# Patient Record
(demographics unavailable — no encounter records)

---

## 2018-12-12 NOTE — PHYS DOC
Past Medical History


Past Medical History:  No Pertinent History


Past Surgical History:  No Surgical History


Alcohol Use:  None


Drug Use:  None





Adult General


Chief Complaint


Chief Complaint:  NAUSEA/VOMITING/DIARRHA





HPI


HPI





Patient is a 22  year old female who presents to the emergency room today with 

complaints of an irregular vaginal discharge for the last 2 days and 2 episodes 

of nausea and vomiting in last 24 hours. She states her last menstrual period 

was on November 9, 2018. She denies any concerns of pregnancy. Patient states 

that she first vomited after having intercourse last night and then today at 

work when she was working over the [employer] she became nauseated and vomited a 

second time. She denies any fever, diarrhea, dysuria, increased urinary 

frequency, hematuria, or abnormal vaginal odor. Patient also denies any back 

pain, abdominal pain, or pelvic pain.





Review of Systems


Review of Systems





Constitutional: Denies fever or chills []


Eyes: Denies redness, or eye pain []


HENT: Denies nasal congestion or sore throat []


Respiratory: Denies cough or shortness of breath []


GI: Denies abdominal pain, or diarrhea; see history of present illness []


: Denies dysuria or hematuria; see history of present illness []


Musculoskeletal: Denies back pain


Integument: Denies rash or skin lesions []


Neurologic: Denies headache, focal weakness or sensory changes []








All other systems were reviewed and found to be within normal limits, except as 

documented in this note.





Current Medications


Current Medications





Current Medications








 Medications


  (Trade)  Dose


 Ordered  Sig/Sky  Start Time


 Stop Time Status Last Admin


Dose Admin


 


 Azithromycin


  (Zithromax)  1,000 mg  1X  ONCE  12/12/18 16:30


 12/12/18 16:31 DC 12/12/18 16:58


1,000 MG


 


 Ceftriaxone Sodium


  (Rocephin Im)  250 mg  1X  ONCE  12/12/18 16:30


 12/12/18 16:31 DC 12/12/18 16:57


250 MG


 


 Ceftriaxone Sodium


  (Rocephin)  1 gm  1X  ONCE  12/12/18 17:30


 12/12/18 17:31 DC 12/12/18 18:12


1 GM


 


 Ondansetron HCl


  (Zofran)  4 mg  1X  ONCE  12/12/18 16:30


 12/12/18 16:31 DC 12/12/18 16:58


4 MG


 


 Sodium Chloride  1,000 ml @ 


 1,000 mls/hr  1X  ONCE  12/12/18 16:30


 12/12/18 17:29 DC 12/12/18 16:57


1,000 MLS/HR











Allergies


Allergies





Allergies








Coded Allergies Type Severity Reaction Last Updated Verified


 


  No Known Drug Allergies    8/22/14 No











Physical Exam


Physical Exam





Constitutional: Well developed, well nourished, no acute distress, non-toxic 

appearance. []


HENT: Normocephalic, atraumatic, bilateral external ears normal,  nose normal. [

]


Eyes: conjunctiva normal, no discharge. [] 


Neck: Normal range of motion


 Pelvic Exam: Chaperone present [place] ERT


 Abdomen:      Nontender


 External Genitalia:   Normal Skin


 Speculum:      Normal vaginal mucosa, normal cervical discharge


 Bimanual:       No adnexal masses or tenderness, No CMT


Skin: Warm, dry, no erythema, no rash. [] 


Extremities: No cyanosis, ROM intact, no edema. [] 


Neurologic: Alert and oriented X 3, normal motor function, normal sensory 

function, no focal deficits noted. []


Psychologic: Affect normal, judgement normal, mood normal. []





Current Patient Data


Vital Signs





 Vital Signs








  Date Time  Temp Pulse Resp B/P (MAP) Pulse Ox O2 Delivery O2 Flow Rate FiO2


 


12/12/18 17:30  84 18 115/74 (88) 98   


 


12/12/18 15:43 97.8     Room Air  





 97.8       








Lab Values





 Laboratory Tests








Test


 12/12/18


15:39 12/12/18


15:48 12/12/18


16:47


 


Urine Collection Type Void    


 


Urine Color Yellow    


 


Urine Clarity Clear    


 


Urine pH 6.5    


 


Urine Specific Gravity 1.025    


 


Urine Protein


 Negative mg/dL


(NEG-TRACE) 


 





 


Urine Glucose (UA)


 Negative mg/dL


(NEG) 


 





 


Urine Ketones (Stick)


 Negative mg/dL


(NEG) 


 





 


Urine Blood Trace (NEG)    


 


Urine Nitrite


 Positive (NEG)


 


 





 


Urine Bilirubin


 Negative (NEG)


 


 





 


Urine Urobilinogen Dipstick


 1.0 mg/dL (0.2


mg/dL) 


 





 


Urine Leukocyte Esterase Large (NEG)    


 


Urine RBC


 1-2 /HPF (0-2)


 


 





 


Urine WBC


 11-20 /HPF


(0-4) 


 





 


Urine Squamous Epithelial


Cells Many /LPF  


 


 





 


Urine Bacteria


 Many /HPF


(0-FEW) 


 





 


Urine Mucus Marked /LPF    


 


POC Urine HCG, Qualitative


 


 Hcg negative


(Negative) 





 


White Blood Count


 


 


 6.7 x10^3/uL


(4.0-11.0)


 


Red Blood Count


 


 


 4.63 x10^6/uL


(3.50-5.40)


 


Hemoglobin


 


 


 14.7 g/dL


(12.0-15.5)


 


Hematocrit


 


 


 41.5 %


(36.0-47.0)


 


Mean Corpuscular Volume


 


 


 90 fL ()





 


Mean Corpuscular Hemoglobin   32 pg (25-35)  


 


Mean Corpuscular Hemoglobin


Concent 


 


 35 g/dL


(31-37)


 


Red Cell Distribution Width


 


 


 12.4 %


(11.5-14.5)


 


Platelet Count


 


 


 246 x10^3/uL


(140-400)


 


Neutrophils (%) (Auto)   63 % (31-73)  


 


Lymphocytes (%) (Auto)   29 % (24-48)  


 


Monocytes (%) (Auto)   6 % (0-9)  


 


Eosinophils (%) (Auto)   1 % (0-3)  


 


Basophils (%) (Auto)   1 % (0-3)  


 


Neutrophils # (Auto)


 


 


 4.2 x10^3uL


(1.8-7.7)


 


Lymphocytes # (Auto)


 


 


 1.9 x10^3/uL


(1.0-4.8)


 


Monocytes # (Auto)


 


 


 0.4 x10^3/uL


(0.0-1.1)


 


Eosinophils # (Auto)


 


 


 0.1 x10^3/uL


(0.0-0.7)


 


Basophils # (Auto)


 


 


 0.0 x10^3/uL


(0.0-0.2)


 


Sodium Level


 


 


 137 mmol/L


(136-145)


 


Potassium Level


 


 


 3.9 mmol/L


(3.5-5.1)


 


Chloride Level


 


 


 103 mmol/L


()


 


Carbon Dioxide Level


 


 


 29 mmol/L


(21-32)


 


Anion Gap   5 (6-14)  L


 


Blood Urea Nitrogen


 


 


 16 mg/dL


(7-20)


 


Creatinine


 


 


 0.9 mg/dL


(0.6-1.0)


 


Estimated GFR


(Cockcroft-Gault) 


 


 78.3  





 


Glucose Level


 


 


 94 mg/dL


(70-99)


 


Calcium Level


 


 


 9.2 mg/dL


(8.5-10.1)





 Laboratory Tests


12/12/18 16:47








 Laboratory Tests


12/12/18 16:47











Microbiology


12/12/18 Wet Prep - Final, Complete


           








EKG


EKG


[]





Radiology/Procedures


Radiology/Procedures


[]





Course & Med Decision Making


Course & Med Decision Making


Pertinent Labs and Imaging studies reviewed. (See chart for details)


dx: nausea vomiting, UTI


Wet mount was negative for any abnormal findings. CBC and BMP within normal 

limits. UA positive for nitrites, large amount of leukocytes, and many bacteria


Pt received 1 gm rocephin IV, 1 L NS, and 4 mg of zofran in the ER. Reports 

feeling better


Patient was treated prophylactically with 250 mg of IM Rocephin, and 1 g of PO 

Zithromax. Patient was instructed to avoid having intercourse until the results 

of gonorrhea and chlamydia testing were available, patient was notified that 

these results would not be available for 48 hours. If one or both of these 

tests is positive, patient needs to refrain from intercourse for approximately 

2 weeks following the treatment of any current partners. 


Prescription written for keflex and zofran. Increase clear fluids, avoid 

bladder irritants. Follow up with PCP if sx persist, return to ER if symptoms 

worsen. 


Patient verbalized an understanding of home care, medications, follow-up, and 

return to ED instructions and was in agreement with the plan of care.








[]


Staff Physician Addendum:


I was working in the ER during the course of this patient's visit.  I was 

available for consultation as needed, but I was not directly involved in the 

care of this patient.





Dragon Disclaimer


Dragon Disclaimer


This electronic medical record was generated, in whole or in part, using a 

voice recognition dictation system.





Departure


Departure


Impression:  


 Primary Impression:  


 Nausea & vomiting


 Additional Impression:  


 UTI (urinary tract infection)


Disposition:  01 HOME, SELF-CARE


Condition:  STABLE


Referrals:  


NO PCP (PCP)


Patient Instructions:  Nausea and Vomiting, Easy-to-Read, Urinary Tract 

Infection, Easy-to-Read





Additional Instructions:  


Fill prescription(s) and use as directed. Avoid bladder irritants such as 

caffeine, carbonation, and spicy foods. Increase clear fluids. You were treated 

prophylactically for a suspected STI, to avoid having intercourse until the 

results of gonorrhea and chlamydia testing are available. These results will 

not be available for 48 hours. If one or both of these tests is positive, You 

need to refrain from intercourse for approximately 2 weeks following the 

treatment of any current partners. Follow up with PCP if sx persist, return to 

ER if symptoms worsen. Follow up with your primary care doctor if symptoms 

persist, return to the ER if symptoms worsen.


Scripts


Ondansetron (ONDANSETRON ODT) 4 Mg Tab.rapdis


1 TAB PO PRN Q6-8HRS PRN for NAUSEA/VOMITING for 4 Days, #16 TAB 0 Refills


   Prov: JENNY HA         12/12/18 


Cephalexin (KEFLEX) 500 Mg Capsule


1 CAP PO BID, #14 CAP 0 Refills


   Prov: JENNY HA         12/12/18





Problem Qualifiers








 Primary Impression:  


 Nausea & vomiting


 Vomiting type:  unspecified  Vomiting Intractability:  non-intractable  

Qualified Codes:  R11.2 - Nausea with vomiting, unspecified


 Additional Impression:  


 UTI (urinary tract infection)


 Urinary tract infection type:  site unspecified  Hematuria presence:  without 

hematuria  Qualified Codes:  N39.0 - Urinary tract infection, site not specified








JENNY HA Dec 12, 2018 16:58


KATERYNA ADKINS MD Dec 13, 2018 06:10

## 2019-02-12 NOTE — PHYS DOC
Past Medical History


Past Medical History:  No Pertinent History


Past Surgical History:  No Surgical History


Alcohol Use:  None


Drug Use:  None





Adult General


Chief Complaint


Chief Complaint:  SORE THROAT





HPI


HPI





Patient is a 22  year old female who is presents for evaluation of sore throat. 

Patient reports onset yesterday. Positive cough, positive nasal congestion, 

positive postnasal drip. Pain is mild. 2-3 out of 10, sharp. No difficulty with 

by mouth intake. No fever. No sick contacts. No myalgias. No history of 

recurrent strep. Unsure pregnancy status. Has not taken anything over-the-

counter. Requesting work note.





Review of Systems


Review of Systems





Constitutional: Denies fever or chills []


Eyes: Denies change in visual acuity, redness, or eye pain []


HENT: +nasal congestion present, +sore throat


Respiratory: Denies cough or shortness of breath []


Cardiovascular: No chest pain, no palpitations, no orthopnea


GI: Denies abdominal pain, nausea, vomiting, bloody stools or diarrhea []


: Denies dysuria or hematuria []





Integument: Denies rash or skin lesions []





Endocrine: Denies polyuria or polydipsia []





All other systems were reviewed and found to be within normal limits, except as 

documented in this note.





Allergies


Allergies





Allergies








Coded Allergies Type Severity Reaction Last Updated Verified


 


  No Known Drug Allergies    8/22/14 No











Physical Exam


Physical Exam





Constitutional: Well developed, well nourished, no acute distress, non-toxic 

appearance. []


HENT: Normocephalic, atraumatic, Mild oropharynx erythema, moist, no oral 

exudates, nose normal. []


Eyes: PERRLA, EOMI, conjunctiva normal, no discharge. [] 


Neck: no stridor. [] 


Cardiovascular:Heart rate regular rhythm, no murmur []


Lungs & Thorax:  Bilateral breath sounds clear to auscultation []


Back: No tenderness, no CVA tenderness. [] 


Extremities:  no edema. [] 


Neurologic: Alert and oriented X 3, no focal deficits noted. []


Psychologic: Affect normal, judgement normal, mood normal. []





Current Patient Data


Vital Signs





 Vital Signs








  Date Time  Temp Pulse Resp B/P (MAP) Pulse Ox O2 Delivery O2 Flow Rate FiO2


 


2/12/19 11:23 98.3 90 16 108/69 (82) 97 Room Air  





 98.3       








Lab Values





 Laboratory Tests








Test


 2/12/19


11:58


 


POC Urine HCG, Qualitative


 Hcg negative


(Negative)











EKG


EKG


[]





Radiology/Procedures


Radiology/Procedures


[]





Course & Med Decision Making


Course & Med Decision Making


Pertinent Labs and Imaging studies reviewed. (See chart for details)





[]Pregnancy test negative. Advised continue supportive care. History and exam 

consistent with URI at this time. ER return precautions given. Patient 

verbalized understanding. All questions answered.





Dragon Disclaimer


Dragon Disclaimer


This electronic medical record was generated, in whole or in part, using a 

voice recognition dictation system.





Departure


Departure


Impression:  


 Primary Impression:  


 Cough


 Additional Impression:  


 Viral pharyngitis


Disposition:  01 HOME, SELF-CARE


Condition:  STABLE


Referrals:  


NO PCP (PCP)


Patient Instructions:  Cough, Adult, Sore Throat, Easy-to-Read





Additional Instructions:  


Thank you for coming to [facility]. Please read the attached 

handouts. Please follow-up with your primary care physician. Take over-the-

counter ibuprofen or Tylenol for pain control. Return to the ER  if your 

symptoms worsen or you have any other concerns.





Problem Qualifiers











SUMMER VAZQUEZ DO Feb 12, 2019 12:26

## 2019-08-04 NOTE — RAD
Exam performed: X-ray abdomen KUB. 

 

Clinical Indication: Abdominal pain

 

Date of Service: 8/4/2019  Comparison: None available

 

FINDINGS:

 

Supine radiograph of the abdomen and pelvis reveals no evidence of ileus 

or obstruction. Definite pathologic calcification or organomegaly is not 

identified.  The visualized osseous structures appear unremarkable.

 

Impression:

 

    1. Negative exam 

 

Electronically signed by: Rupa Landry MD (8/4/2019 5:47 PM) [facility]

## 2019-08-04 NOTE — PHYS DOC
Past Medical History


Past Medical History:  No Pertinent History


Past Surgical History:  No Surgical History


Alcohol Use:  None


Drug Use:  None





Adult General


Chief Complaint


Chief Complaint:  NAUSEA/VOMITING/DIARRHA





HPI


HPI





Patient is a 23  year old [female] who presents with [left lower abdominal pain 

and cramping, intermittent for the past day. Patient reports today she has 

started to tell nauseous, had 2 episodes of emesis, reports was a small amount 

of clear liquid that she had vomited. Reports she has been eating normally. Does

 report she had one small bowel movement earlier today, which mostly watery. 

States this morning she felt just fine. The states she is close to her menstrual

 cycle, denies change in urination, denies  symptoms. Also reports she has not

 had a bowel movement other than her watery stool, for the past 3 days. Reports 

this is not normal for her. ]





Review of Systems


Review of Systems





Constitutional: Denies fever or chills []


Eyes: Denies change in visual acuity, redness, or eye pain []


HENT: Denies nasal congestion or sore throat []


Respiratory: Denies cough or shortness of breath []


Cardiovascular: No additional information not addressed in HPI []


GI: Reports abdominal pain, nausea, 3 episodes of vomiting today,  Denies bloody

 stools. States no bowel movement x 3 day, other than small watery diarrhea x 1 

today []


: Denies dysuria or hematuria []


Musculoskeletal: Denies back pain or joint pain []


Integument: Denies rash or skin lesions []


Neurologic: Denies headache, focal weakness or sensory changes []


Endocrine: Denies polyuria or polydipsia []





All other systems were reviewed and found to be within normal limits, except as 

documented in this note.





Allergies


Allergies





Allergies








Coded Allergies Type Severity Reaction Last Updated Verified


 


  No Known Drug Allergies    8/22/14 No











Physical Exam


Physical Exam





Constitutional: Well developed, well nourished, no acute distress, non-toxic 

appearance. []


HENT: Normocephalic, atraumatic, bilateral external ears normal, oropharynx 

moist, no oral exudates, nose normal. []


Eyes: PERRLA, EOMI, conjunctiva normal, no discharge. [] 


Neck: Normal range of motion, no tenderness, supple, no stridor. [] 


Cardiovascular:Heart rate regular rhythm, no murmur []


Lungs & Thorax:  Bilateral breath sounds clear to auscultation []


Abdomen: Bowel sounds diminished, soft, no tenderness, no masses, no pulsatile 

masses. negative murphys. Negative Rovsing, Negative Psoas, negative Obturator, 

Negative McBurney tenderness.  Minimal tenderness noted LLQ near ovary. [] 


Skin: Warm, dry, no erythema, no rash. [] 


Back: No tenderness, no CVA tenderness. [] 


Extremities: No tenderness, no cyanosis, no clubbing, ROM intact, no edema. [] 


Neurologic: Alert and oriented X 3, normal motor function, normal sensory 

function, no focal deficits noted. []


Psychologic: Affect normal, judgement normal, mood normal. []





Current Patient Data


Vital Signs





                                   Vital Signs








  Date Time  Temp Pulse Resp B/P (MAP) Pulse Ox O2 Delivery O2 Flow Rate FiO2


 


8/4/19 17:46  90 16 97/61 (73) 99 Room Air  


 


8/4/19 17:13 98.1       





 98.1       








Lab Values





                                Laboratory Tests








Test


 8/4/19


16:50 8/4/19


17:11


 


Urine Collection Type Unknown   


 


Urine Color Essence   


 


Urine Clarity Clear   


 


Urine pH 5.5   


 


Urine Specific Gravity >=1.030   


 


Urine Protein


 Negative mg/dL


(NEG-TRACE) 





 


Urine Glucose (UA)


 Negative mg/dL


(NEG) 





 


Urine Ketones (Stick)


 Trace mg/dL


(NEG) 





 


Urine Blood


 Negative (NEG)


 





 


Urine Nitrite


 Negative (NEG)


 





 


Urine Bilirubin Small (NEG)   


 


Urine Urobilinogen Dipstick


 1.0 mg/dL (0.2


mg/dL) 





 


Urine Leukocyte Esterase Large (NEG)   


 


Urine RBC


 Occ /HPF (0-2)


 





 


Urine WBC


 20-40 /HPF


(0-4) 





 


Urine Squamous Epithelial


Cells Mod /LPF  


 





 


Urine Bacteria


 Many /HPF


(0-FEW) 





 


Urine Mucus Marked /LPF   


 


POC Urine HCG, Qualitative


 


 Hcg negative


(Negative)











EKG


EKG


[]





Radiology/Procedures


Radiology/Procedures


[]FINDINGS:


 


Supine radiograph of the abdomen and pelvis reveals no evidence of ileus 


or obstruction. Definite pathologic calcification or organomegaly is not 


identified.  The visualized osseous structures appear unremarkable.


 


Impression:


 


    1. Negative exam 


 


Electronically signed by: Rupa Landry MD (8/4/2019 5:47 PM) [facility]











Course & Med Decision Making


Course & Med Decision Making


Pertinent Labs and Imaging studies reviewed. (See chart for details)





[Reviewed urine results with patient and imaging. Discussed use of antibiotics. 

patient in agreement without further questions or concerns]





Dragon Disclaimer


Dragon Disclaimer


This electronic medical record was generated, in whole or in part, using a voice

recognition dictation system.





Departure


Departure


Impression:  


   Primary Impression:  


   UTI (urinary tract infection)


Disposition:  01 HOME, SELF-CARE


Condition:  GOOD


Referrals:  


NO PCP (PCP)


Patient Instructions:  Urinary Tract Infection, Easy-to-Read





Additional Instructions:  


Increase your fluid intake.


Take tylenol or ibuprofen for discomfort


Take the antibiotic as prescribed


Follow up with your primary care provider


Scripts


Sulfamethoxazole/Trimethoprim (BACTRIM DS TABLET) 1 Each Tablet


1 TAB PO BID for 5 Days, #10 TAB


   Prov: TAMIE COWAN         8/4/19











TAMIE COWAN               Aug 4, 2019 17:30

## 2020-04-06 NOTE — PHYS DOC
Past Medical History


Past Medical History:  No Pertinent History


Past Surgical History:  No Surgical History


Smoking Status:  Never Smoker


Alcohol Use:  None


Drug Use:  None





Adult General


Chief Complaint


Chief Complaint:  COUGH





HPI


HPI





Patient is a 23  year old female who presents with dry cough for the last 3 days

.  She also has a runny nose with postnasal drip.  She states she is been taking

cough medicine.  She denies fever, Abdominal pain, nausea, vomiting, diarrhea, 

chest pain, shortness of air, dizziness, headache, fatigue.





Review of Systems


Review of Systems





HENT:  nasal congestion or denies sore throat []


Respiratory: cough or denies shortness of breath []








All other systems were reviewed and found to be within normal limits, except as 

documented in this note.





Allergies


Allergies





Allergies








Coded Allergies Type Severity Reaction Last Updated Verified


 


  No Known Drug Allergies    8/22/14 No











Physical Exam


Physical Exam





Constitutional: Well developed, well nourished, no acute distress, non-toxic ap

pearance. []


HENT: Normocephalic, atraumatic, bilateral external ears normal, oropharynx 

moist, no oral exudates, nose normal. Post nasal drip.  []


Eyes: PERRLA, EOMI, conjunctiva normal, no discharge. [] 


Neck: Normal range of motion, no tenderness, supple, no stridor. [] 


Cardiovascular:Heart rate regular rhythm, no murmur []


Lungs & Thorax:  Bilateral breath sounds clear to auscultation []


Abdomen: Bowel sounds normal, soft, no tenderness, no masses, no pulsatile 

masses. [] 


Skin: Warm, dry, no erythema, no rash. [] 


Back: No tenderness, no CVA tenderness. [] 


Extremities: No tenderness, no cyanosis, no clubbing, ROM intact, no edema. [] 


Neurologic: Alert and oriented X 3, normal motor function, normal sensory 

function, no focal deficits noted. []


Psychologic: Affect normal, judgement normal, mood normal. []





Current Patient Data


Vital Signs





                                   Vital Signs








  Date Time  Temp Pulse Resp B/P (MAP) Pulse Ox O2 Delivery O2 Flow Rate FiO2


 


4/6/20 10:59 98.3 66 16 111/77 (88) 98 Room Air  





 98.3       











EKG


EKG


[]





Radiology/Procedures


Radiology/Procedures


[]





Course & Med Decision Making


Course & Med Decision Making


Pertinent Labs and Imaging studies reviewed. (See chart for details)





Patient denies any pain.  Alert and oriented.  Ambulatory with steady gait.  

Speaks in full clear sentences.  Lungs are clear to auscultation all lobes.  

Vital signs are within normal limits.  Skin pink warm and dry.  Patient is 

educated to begin taking Zyrtec or Claritin or Benadryl to help dry up 

secretions.  She is educated drink plenty of fluids.  She is educated that she 

can continue taking over-the-counter cough medication.  She is also educated 

that she should come back to emergency room if she starts running a high fever 

and having severe shortness of breath.





[]





Dragon Disclaimer


Dragon Disclaimer


This electronic medical record was generated, in whole or in part, using a voice

 recognition dictation system.





Departure


Departure


Impression:  


   Primary Impression:  


   Cough


   Additional Impression:  


   Nasal congestion


Disposition:  01 HOME, SELF-CARE


Condition:  STABLE


Referrals:  


NO PCP (PCP)


Patient Instructions:  Cough, Adult





Additional Instructions:  


Follow-up with your primary care provider.  Return to the ER if you begin 

running a high fever and having severe shortness of breath.  Take 

over-the-counter allergy medications help dry up secretions and also continue 

taking your cough medication.  Drink plenty of fluids to help thin mucus 

secretions.  Also try some nasal sprays such as Nasacort or Flonase.





Problem Qualifiers











RACHAEL ESPINOZA             Apr 6, 2020 11:31